# Patient Record
Sex: FEMALE | URBAN - METROPOLITAN AREA
[De-identification: names, ages, dates, MRNs, and addresses within clinical notes are randomized per-mention and may not be internally consistent; named-entity substitution may affect disease eponyms.]

---
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
## 2022-04-11 ENCOUNTER — HISTORICAL (OUTPATIENT)
Dept: ADMINISTRATIVE | Facility: HOSPITAL | Age: 22
End: 2022-04-11

## 2022-04-24 VITALS
OXYGEN SATURATION: 97 % | DIASTOLIC BLOOD PRESSURE: 75 MMHG | HEIGHT: 66 IN | BODY MASS INDEX: 33.64 KG/M2 | SYSTOLIC BLOOD PRESSURE: 126 MMHG | WEIGHT: 209.31 LBS

## 2022-04-30 NOTE — PROGRESS NOTES
Patient:   Yoana Lezama            MRN: [id number]            FIN: [id number]               Age:   19 years     Sex:  Female     :  2000   Associated Diagnoses:   None   Author:   Masoud HART, Lobo YOUNG      Visit Information   Visit type:  Scheduled follow-up.    Accompanied by:  No one.    Source of history:  Self.    History limitation:  None.       Chief Complaint   3/21/2019 13:44 CDT      PRENATAL VISIT ,N/V, LOWER ABD. CHEST PAIN        History of Present Illness   18yo  at 36 weeks 6 days gestation with EDC 2019 (+/- 14 days) by 2nd trimester US only presents to [facility] for prenatal care  Patient states that she had been having chest pain following episode of vomiting last night at 730pm.  Chest pain has resolved at this time.  Patient had been having dairrhea for 3 days prior that has also resolved.  Had eaten at [employer] before episode of vomiting.  Described vomitus as food particles and denies any blood.  Patient denying any SOB or palpitations when chest pain was occurring.  Has had some issues with GERD and currently only taking prn TUMs.     Gestational History:   - G1: 10/30/2017 41 weeks, [facility] in [place], [place]. vaginal, VFI, no complications   - G2: Current  Gyn History:    - LMP: 2018   - Age at menarche: 14 years   - Menstrual hx: regular, 5 day cycles, 3-4 pads/day   - History of birth control: none    - History of STDs and/or Abnormal PAPs: none    Past Medical History: None  Surgical History: None  Family History: HTN maternal and paternal  Social History: No ETOH, No smoking, no drugs   Medications: PNVs gummy         Review of Systems   Fetal movements: yes, daily  Vaginal bleeding: denies  Vaginal discharge: yes, white and thick  Loss of fluid: denies  Contractions: denies  Headaches: denies  Vision changes: denies, see HPI  Edema: minimal edema in ankles, recently began      Constitutional:  No fever, No chills, No sweats, No  weakness, No fatigue.    Respiratory:  No shortness of breath, No cough, No wheezing.    Cardiovascular:  No chest pain, No palpitations, No peripheral edema.    Gastrointestinal:  No nausea, No vomiting.    Genitourinary:  No dysuria, No hematuria.    Hematology/Lymphatics:  No bruising tendency, No bleeding tendency, No swollen lymph glands.    Endocrine:  No polyuria, No cold intolerance.    Musculoskeletal:  No back pain, No joint pain, No muscle pain.    Integumentary:  No rash, No pruritus.    Neurologic:  Not alert and oriented X4, No abnormal balance, No confusion, No numbness.    Psychiatric:  No anxiety, No depression, No moisés, Not suicidal, Not delusional.       Physical Examination   Vital Signs   3/21/2019 13:44 CDT      Temperature Oral          36.9 DegC                             Temperature Oral (calculated)             98.42 DegF                             Peripheral Pulse Rate     64 bpm                             Respiratory Rate          20 br/min                             Systolic Blood Pressure   109 mmHg                             Diastolic Blood Pressure  53 mmHg  LOW                             Blood Pressure Location   Right arm                             Blood Pressure Cuff Size  Adult     Documented vital signs:  Oxygen saturation  98  %.    General:  Alert and oriented, No acute distress.    Eye:  Extraocular movements are intact.    HENT:  Normocephalic.    Neck:  Supple.    Respiratory:  Lungs are clear to auscultation, Respirations are non-labored, Breath sounds are equal.    Cardiovascular:  Normal rate, Regular rhythm, Good pulses equal in all extremities, No edema.    Gastrointestinal:  Soft, Non-tender, Normal bowel sounds, gravid.    Obstetric Exam     Uterus: fundal height 36  cm.     Taylor/ Baby A fetal evaluation: heart tones 141 bpm, fetal lie (longitudinal, cephalic).     Cervix: cervical exam deferred.     No contractions noted.     Integumentary:  Warm, Dry.     Neurologic:  Alert, Oriented, No focal deficits.    Cognition and Speech:  Speech clear and coherent.    Psychiatric:  Appropriate mood & affect.       Review / Management   Ultrasound(s):  Initial on 1/8/2019 at [facility]   Comments: Late 2nd trimester Ultrasound for dates.  AUA = 26w 4d with PALMA = 4/12/2019 (+/- 10-14 days).  Weekly fetal assessment to begin at 36 weeks.          Initial OB Labs 1/8/2019:   - Blood Type and Rh: O-   - Antibody Screen: negative   - CBC H/H: 11.4/35.9   - HIV: NR   - Syph Ab: NR   - GC: NR   - CT: NR   - HBsAg: NR   - HCVAb: NR   - Rubella: immune   - Varicella: <135   - UA & Culture: 2/5/2019 unremarkable   - Sickle Cell Screen: N/A   - PAP: N/A   - Influenza vaccine date: Declines 2/5/2019, risk vs benefit provided    15-20 Weeks Lab    - Quad Screen: past date    28 Week Lab Next visit    - 1H GTT: 105    - Rhogam: 2/6/2019    - Date of Tdap: 2017 during last pregnancy in [place]    36 Week Lab 3/6/2019    - CBC H/H: 10.5/33.1    - RPR: NR    - GBS Culture: Neg    - HIV: NR    - Cervical: GC: Neg         Laboratory Results   Today's Lab Results : PowerNote Discrete Results   3/21/2019 13:48 CDT      Urine Color Urine Dipstick                Jannette                             Urine Appearance Urine Dipstick           Slightly cloudy                             pH Urine Dipstick         6                             Specific Gravity Urine Dipstick           1.010                             Blood Urine Dipstick      Negative                             Glucose Urine Dipstick    Negative                             Ketones Urine Dipstick    3+                             Protein Urine Dipstick    Trace                             Bilirubin Urine Dipstick  Negative                             Urobilinogen Urine Dipstick               0.2 mg/dl                             Leukocytes Urine Dipstick Moderate                             Nitrite Urine Dipstick    Negative            Impression and Plan   Diagnosis     18 year old  female at 36wks 6days      1. Intrauterine Pregnancy with late prenatal care    - OB Protocol     - continue taking PNVs daily     - Urine dip: as above - likley due to dehydration from vomiting and diarrhea    - UA and urine cx ordered today    - Routine (initial) labs: reviewed    - Mother plans on breast and/or bottlefeeds. Offered lact[facility] consulation at [facility], pt declined    - Postpartum contraception: desires Nexplanon    - Labor precautions discussed in depth    - Weekly fetal assesment begin at 36 weeks   - Will need induction scheduled for 39 weeks     2. Chest pain after vomiting - likely related to GERD    - prescription for Pepcid qd provided today    - may continue with TUMs prn    - advised against laying/sleeping after eating    -ED/WIC precautions given regarding worsening chest pain, SOB, or palpitations     3. Anemia in pregnancy    - Physiologic     4. Varicella non-immune    - Precautions provided to avoid contact to persons with lesions especially crusting lesions or known varicella infection     5. Rh incompatibility    - Rhogam 300 mcg administered 2019     6. Vaginal candidiasis/discharge - resolved    - GC/CT/RPR negative at initial visit    - Wet prep 2019 with few clue cells (<50%) and few yeast    - Clotrimazole 1% vag x7 days prescribed     7. Blurry vision, dizziness, hot flashes - resolved    - TSH, CBC, CMP unremarkable 2019    Labor precautions discussed and go to the closest hospital if experience: Fever, vaginal bleeding or leaking fluid, belly cramping or pain, shortness of breathchest pain, swelling of the facehands, ankles and feet or legs.  If don't feel the baby move in over an hour, change in vision, Severe headache that are not resolved with medication.      RTC in 1 weeks for routine prenatal care

## 2022-04-30 NOTE — PROGRESS NOTES
Patient:   Yoana Lezama            MRN: [id number]            FIN: [id number]               Age:   19 years     Sex:  Female     :  2000   Associated Diagnoses:   None   Author:   Kei Hodge MD      History of Present Illness   18yo  at 30 weeks 4 days gestation with EDC 2019 (+/- 14 days) by 2nd trimester US only presents to [facility] clinic for prenatal care.  Abdominal to back pain  Occasionally feels overheated and has blurry vision. Denies black spots in vision. Denies swelling in hands feet.      Histories   Gestational History:   - G1: 41 weeks, [facility] in [place], [place]. vaginal, VFI, no complications   - G2: Current  Gyn History:    - LMP: 2018   - Age at menarche: 14 years   - Menstrual hx: regular, 5 day cycles, 3-4 pads/day   - History of birth control: none    - History of STDs and/or Abnormal PAPs: none    Past Medical History: None  Surgical History: None  Family History: HTN maternal and paternal  Social History: No ETOH, No smoking, no drugs   Medications: PNVs gummy      Past Medical History:    Resolved  Pregnant ([id number]): Onset on 1/15/2017 at 16 years.  Resolved on 10/30/2017 at 17 years.   Family History:    High blood pressure                                                                                                                                                                                                                     09-AUG-2016 15:25:48<$>  Grandmother     Procedure history:    No active procedure history items have been selected or recorded.   Social History        Social & Psychosocial Habits    Alcohol  2019  Use: Past    Type: Liquor    Employment/School  2019  Status: Unemployed    Exercise  2019  Duration (average number of minutes): 0    Home/Environment  2019  Lives with: Children, Mother    Alcohol abuse in household: No    Substance abuse in household: No    Smoker in household: No     Injuries/Abuse/Neglect in household: No    Feels unsafe at home: No    Concerns over TV/Computer/Game use: No    Nutrition/Health  01/08/2019  Type of diet: Regular    Sexual  01/08/2019  Sexually active: No    First active at age: 15 Years    Current partners: 0    Number of lifetime partners: 2    Do you think of your sexual orientation as: Straight or heterosexual    Uses condoms: No    History of sexual abuse: No    What is your current gender identity? (Check all that apply) Identifies as female    Substance Abuse  01/08/2019  Use: Never    Tobacco  01/08/2019  Use: Never (less than 100 in l    Patient Wants Consult For Cessation Counseling N/A.        Review of Systems   Fetal movements: yes, daily  Vaginal bleeding: denies  Vaginal discharge: yes, yellow to green increasing in amount  Loss of fluid: denies  Contractions: denies  Headaches: denies  Vision changes: denies  Edema: denies      Constitutional:  No fever, No chills, No sweats, No weakness, No fatigue.    Respiratory:  No shortness of breath, No cough, No wheezing.    Cardiovascular:  No chest pain, No palpitations, No peripheral edema.    Gastrointestinal:  No nausea, No vomiting.    Genitourinary:  No dysuria, No hematuria.    Hematology/Lymphatics:  No bruising tendency, No bleeding tendency, No swollen lymph glands.    Endocrine:  No polyuria, No cold intolerance.    Musculoskeletal:  No back pain, No joint pain, No muscle pain.    Integumentary:  No rash, No pruritus.    Neurologic:  Not alert and oriented X4, No abnormal balance, No confusion, No numbness.    Psychiatric:  No anxiety, No depression, No moisés, Not suicidal, Not delusional.       Physical Examination   Vital Signs   2/5/2019 13:01 CST       Temperature Oral          36.6 DegC                             Peripheral Pulse Rate     94 bpm  HI                             Respiratory Rate          18 br/min                             SpO2                      95 %                              Systolic Blood Pressure   113 mmHg                             Diastolic Blood Pressure  73 mmHg     Measurements from flowsheet : Measurements   2/5/2019 13:01 CST       Weight Measured           95.60 kg                             Height/Length Measured    167 cm     General:  Alert and oriented, No acute distress.    Eye:  Extraocular movements are intact.    HENT:  Normocephalic.    Neck:  Supple.    Respiratory:  Lungs are clear to auscultation, Respirations are non-labored, Breath sounds are equal.    Cardiovascular:  Normal rate, Regular rhythm, Good pulses equal in all extremities, No edema.    Gastrointestinal:  Soft, Non-tender, Normal bowel sounds, gravid.    Obstetric Exam     Uterus: fundal height 29.5  cm.     Vagina: discharge (small amount, gray, purulent, white, not foul smelling).     No contractions noted.     Integumentary:  Warm, Dry.    Neurologic:  Alert, Oriented, No focal deficits.    Cognition and Speech:  Speech clear and coherent.    Psychiatric:  Appropriate mood & affect.       Review / Management   Results review:  Lab results   2/5/2019 13:04 CST       Urine Color Urine Dipstick                Jannette                             Urine Appearance Urine Dipstick           Cloudy                             pH Urine Dipstick         6                             Specific Gravity Urine Dipstick           1.025                             Blood Urine Dipstick      Trace                             Glucose Urine Dipstick    Negative                             Ketones Urine Dipstick    Negative                             Protein Urine Dipstick    1+ (30 mg/dl)                             Bilirubin Urine Dipstick  Negative                             Urobilinogen Urine Dipstick               0.2 mg/dl                             Leukocytes Urine Dipstick Moderate                             Nitrite Urine Dipstick    Negative  .    Laboratory Results   Radiology results    Ultrasound(s):  Initial on 2019 at [facility]   Comments: Late 2nd trimester Ultrasound for dates.  AUA = 26w 4d with PALMA = 2019 (+/- 10-14 days).  Weekly fetal assessment to begin at 36 weeks.          Initial OB Labs 2019:   - Blood Type and Rh: O-   - Antibody Screen: negative   - CBC H/H: 11.4/35.9   - HIV: NR   - Syph Ab: NR   - GC: NR   - CT: NR   - HBsAg: NR   - HCVAb: NR   - Rubella: immune   - Varicella: <135   - UA & Culture: 2019   - Sickle Cell Screen: N/A   - PAP: N/A   - Influenza vaccine date: Declines 2019, risk vs benefit provided    15-20 Weeks Lab    - Quad Screen: past date    28 Week Lab Next visit    - 1H GTT: 2019    - Rhogam: 2019    - Date of Tdap:  during last pregnancy in [place]    36 Week Lab    - CBC H/H: _    - RPR: _    - GBS Culture: _    - HIV: _    - Urine: GC: _            Impression and Plan   Diagnosis     18 year old  female at 30wks 4days      1. Intrauterine Pregnancy with late prenatal care    - OB Protocol     - continue taking PNVs daily     - Urine dip: as above     - SG >1.020 with high-normal HR. Instructed patient to increase fluid intake    - Urinalysis and urine culture ordered    - Routine (initial) labs: pending    - Mother plans on breast and/or bottlefeeds. Offered lactation consulation at [facility], pt declined    - Postpartum contraception: desires Nexplanon    - Labor precautions discussed in depth    - Assign to PCP; RTC in 2 wks    - Weekly fetal assesment begin at 36 weeks   - Will need induction scheduled for 39 weeks     2. Anemia in pregnancy    - Physiologic     3. Varicella non-immube   - Precautions provided to avoid contact to persons with lesions especially crusting lesions or known varicella infection     4. Rh incompatibility   - Rhogam ordered this visit   - Patient stated she was too hungry to wait for injection and would return to clinic, but did not return to clinic before closing. Nursing to contact patient to  instruct needs follow-up 2/6/2019     5. Vaginal discharge    - GC/CT/RPR negative at last visit    - States no change in discharge    - Wet prep ordered today     6. Blurry vision, dizziness, hot flashes    - TSH, CBC, CMP ordered    Labor precautions discussed and go to the closest hospital if experience: Fever, vaginal bleeding or leaking fluid, belly cramping or pain, shortness of breathchest pain, swelling of the facehands, ankles and feet or legs.  If don't feel the baby move in over an hour, change in vision, Severe headache that are not resolved with medication.      Will need to return to clinic for RhoGAM ASAP  RTC in 2 weeks for routine prenatal care

## 2022-09-21 ENCOUNTER — HISTORICAL (OUTPATIENT)
Dept: ADMINISTRATIVE | Facility: HOSPITAL | Age: 22
End: 2022-09-21